# Patient Record
Sex: MALE | Race: ASIAN | NOT HISPANIC OR LATINO
[De-identification: names, ages, dates, MRNs, and addresses within clinical notes are randomized per-mention and may not be internally consistent; named-entity substitution may affect disease eponyms.]

---

## 2022-05-16 ENCOUNTER — NON-APPOINTMENT (OUTPATIENT)
Age: 42
End: 2022-05-16

## 2022-12-02 PROBLEM — Z00.00 ENCOUNTER FOR PREVENTIVE HEALTH EXAMINATION: Status: ACTIVE | Noted: 2022-12-02

## 2022-12-13 ENCOUNTER — APPOINTMENT (OUTPATIENT)
Dept: HEPATOLOGY | Facility: CLINIC | Age: 42
End: 2022-12-13

## 2022-12-13 ENCOUNTER — APPOINTMENT (OUTPATIENT)
Dept: HEPATOLOGY | Facility: CLINIC | Age: 42
End: 2022-12-13
Payer: COMMERCIAL

## 2022-12-13 VITALS
TEMPERATURE: 98.4 F | BODY MASS INDEX: 26.45 KG/M2 | HEART RATE: 67 BPM | OXYGEN SATURATION: 97 % | WEIGHT: 224 LBS | HEIGHT: 77 IN | RESPIRATION RATE: 16 BRPM | DIASTOLIC BLOOD PRESSURE: 92 MMHG | SYSTOLIC BLOOD PRESSURE: 131 MMHG

## 2022-12-13 DIAGNOSIS — K60.2 ANAL FISSURE, UNSPECIFIED: ICD-10-CM

## 2022-12-13 DIAGNOSIS — Z78.9 OTHER SPECIFIED HEALTH STATUS: ICD-10-CM

## 2022-12-13 DIAGNOSIS — Z87.19 PERSONAL HISTORY OF OTHER DISEASES OF THE DIGESTIVE SYSTEM: ICD-10-CM

## 2022-12-13 DIAGNOSIS — K21.9 GASTRO-ESOPHAGEAL REFLUX DISEASE W/OUT ESOPHAGITIS: ICD-10-CM

## 2022-12-13 DIAGNOSIS — K64.4 RESIDUAL HEMORRHOIDAL SKIN TAGS: ICD-10-CM

## 2022-12-13 PROCEDURE — 99204 OFFICE O/P NEW MOD 45 MIN: CPT

## 2022-12-22 ENCOUNTER — NON-APPOINTMENT (OUTPATIENT)
Age: 42
End: 2022-12-22

## 2022-12-22 NOTE — ASSESSMENT
[FreeTextEntry1] : 41 y/o M with rectal fissure, hemorrhoids, GERD, chronic hep B. Last labs 5/2022 showed LFTs WNL and low viremia. Pt with symptomatic GERD, but he is trying to change his diet and is off of PPI. Pt with recent change on stool consistency. No bleeding. \par \par \par Plans:\par \par HBV: Natural course of HBV discussed with pt. Fibroscan once a year. Fibroscan today showed F0-F1, S2-S3. Labs today. \par \par HCC Screening: Abd US q6m. Abd US before next visit.\par \par Fatty liver: Natural course of fatty liver discussed with pt. Advised to diet/exercise to lose weights. Lipid management as per PCP.\par \par Hemorrhoids/Change in stool: referred to get colonoscopy. Pt said he will decide to do it at [facility] or [facility]. Numbers given to pt to call to schedule. \par \par GERD: Pt is changing diet to control GERD. Not on PPI. \par \par RTC in 2-3 months. \par \par \par

## 2022-12-22 NOTE — HISTORY OF PRESENT ILLNESS
[FreeTextEntry1] : \par \par 41 y/o M with rectal fissure, hemorrhoids, GERD, fatty liver, chronic hep B self referred to hepatology for follow up. Pt used to see Dr. Ngoc Pineda at [facility]. Pt reports he probably has hep B since he was a child. He is not aware if his parents or sibling has hep B. Reports he was always told he has inactive hep B and does not need to start treatment. \par In 5/2022 pt was given prednisone for back pain and developed diarrhea and rectal bleed in the setting of steroids. Rectal bleeding stopped and pt was off of prednisone. \par \par Pt with GERD and not on PPI. Pt reports he is trying to change his diet to help with GERD. Reports change in stool consistency lately. No bleeding. \par \par Pt is preparing for IVF with his wife and wants hepatology clearance. Today pt reports feeling well. Denies fever, chills, n/v/d/c ,SOB, CP, HA, dizziness, abd pain, jaundice. \par \par \par \par Social Hx \par Tobacco: Never \par Alcohol: no\par illicit drug: no \par Herb and dietary Supplement: no \par FMH of liver disease: None\par \par Labs:\par 5/24/22\par AST/ALT 27/43   ALP 60    TB 0.8     GLU 89\par HBV DNA 78\par H/H 15.7/46.5    \par \par 10/20/21\par HBSAb 61.4\par \par Scans:\par Fibroscan 12/13/22: 376 CAP (S2-S3)  4.4kPA (F0-F1)\par Abd US 5/19/2022: Fatty liver. areas of probable fatty sparing again seen in left hepatic lobe. Cholelithiasis. \par \par Fibroscan 2020:  (was 386) KpA normal\par \par \par Procedures:\par Colonoscopy: never\par EGD 6/29/2022: Z-line was regular. small hiatal hernia. Small amount of phytobezoar in gastric body. Single localized erosion without bleeding.Path: squamous esophageal mucosa with mild reactive changes. No columnar mucosa.\par

## 2022-12-22 NOTE — PHYSICAL EXAM
[Non-Tender] : non-tender [General Appearance - Alert] : alert [General Appearance - In No Acute Distress] : in no acute distress [General Appearance - Well Nourished] : well nourished [General Appearance - Well Developed] : well developed [General Appearance - Well-Appearing] : healthy appearing [Sclera] : the sclera and conjunctiva were normal [Neck Appearance] : the appearance of the neck was normal [] : no respiratory distress [Exaggerated Use Of Accessory Muscles For Inspiration] : no accessory muscle use [Heart Rate And Rhythm] : heart rate was normal and rhythm regular [Edema] : there was no peripheral edema [Veins - Varicosity Changes] : there were no varicosital changes [Bowel Sounds] : normal bowel sounds [Abdomen Soft] : soft [Abdomen Tenderness] : non-tender [Abdomen Mass (___ Cm)] : no abdominal mass palpated [No Focal Deficits] : no focal deficits [Oriented To Time, Place, And Person] : oriented to person, place, and time [Scleral Icterus] : No Scleral Icterus [Spider Angioma] : No spider angioma(s) were observed [Abdominal  Ascites] : no ascites [Asterixis] : no asterixis observed [Jaundice] : No jaundice [Palmar Erythema] : no Palmar Erythema

## 2022-12-22 NOTE — REVIEW OF SYSTEMS
[Fever] : no fever [Chills] : no chills [Feeling Poorly] : not feeling poorly [Feeling Tired] : not feeling tired [Nosebleeds] : no nosebleeds [Nasal Discharge] : no nasal discharge [Sore Throat] : no sore throat [Hoarseness] : no hoarseness [Chest Pain] : no chest pain [Palpitations] : no palpitations [Leg Claudication] : no intermittent leg claudication [Lower Ext Edema] : no extremity edema [Shortness Of Breath] : no shortness of breath [Wheezing] : no wheezing [Cough] : no cough [SOB on Exertion] : no shortness of breath during exertion [Abdominal Pain] : no abdominal pain [Vomiting] : no vomiting [Constipation] : no constipation [Diarrhea] : no diarrhea [Heartburn] : no heartburn [Melena] : no melena [Confused] : no confusion [Dizziness] : no dizziness

## 2023-01-12 LAB
AFP-TM SERPL-MCNC: 2.2 NG/ML
ALBUMIN SERPL ELPH-MCNC: 4.9 G/DL
ALP BLD-CCNC: 69 U/L
ALT SERPL-CCNC: 35 U/L
ANION GAP SERPL CALC-SCNC: 12 MMOL/L
AST SERPL-CCNC: 26 U/L
BASOPHILS # BLD AUTO: 0.04 K/UL
BASOPHILS NFR BLD AUTO: 0.8 %
BILIRUB SERPL-MCNC: 0.9 MG/DL
BUN SERPL-MCNC: 12 MG/DL
CALCIUM SERPL-MCNC: 9.6 MG/DL
CHLORIDE SERPL-SCNC: 103 MMOL/L
CO2 SERPL-SCNC: 24 MMOL/L
CREAT SERPL-MCNC: 1.02 MG/DL
EGFR: 94 ML/MIN/1.73M2
EOSINOPHIL # BLD AUTO: 0.14 K/UL
EOSINOPHIL NFR BLD AUTO: 2.9 %
GLUCOSE SERPL-MCNC: 88 MG/DL
HBV DNA # SERPL NAA+PROBE: 37 IU/ML
HBV SURFACE AB SERPL IA-ACNC: 66.1 MIU/ML
HCT VFR BLD CALC: 49.3 %
HEPB DNA PCR INT: DETECTED
HEPB DNA PCR LOG: 1.57 LOGIU/ML
HGB BLD-MCNC: 16.4 G/DL
IMM GRANULOCYTES NFR BLD AUTO: 0.2 %
LYMPHOCYTES # BLD AUTO: 1.81 K/UL
LYMPHOCYTES NFR BLD AUTO: 36.9 %
MAN DIFF?: NORMAL
MCHC RBC-ENTMCNC: 29.1 PG
MCHC RBC-ENTMCNC: 33.3 GM/DL
MCV RBC AUTO: 87.4 FL
MONOCYTES # BLD AUTO: 0.36 K/UL
MONOCYTES NFR BLD AUTO: 7.3 %
NEUTROPHILS # BLD AUTO: 2.55 K/UL
NEUTROPHILS NFR BLD AUTO: 51.9 %
PLATELET # BLD AUTO: 180 K/UL
POTASSIUM SERPL-SCNC: 4.3 MMOL/L
PROT SERPL-MCNC: 7.8 G/DL
RBC # BLD: 5.64 M/UL
RBC # FLD: 12.7 %
SODIUM SERPL-SCNC: 139 MMOL/L
WBC # FLD AUTO: 4.91 K/UL

## 2023-02-28 ENCOUNTER — TRANSCRIPTION ENCOUNTER (OUTPATIENT)
Age: 43
End: 2023-02-28

## 2023-03-09 ENCOUNTER — OUTPATIENT (OUTPATIENT)
Dept: OUTPATIENT SERVICES | Facility: HOSPITAL | Age: 43
LOS: 1 days | End: 2023-03-09
Payer: COMMERCIAL

## 2023-03-09 ENCOUNTER — APPOINTMENT (OUTPATIENT)
Dept: ULTRASOUND IMAGING | Facility: CLINIC | Age: 43
End: 2023-03-09
Payer: COMMERCIAL

## 2023-03-09 ENCOUNTER — APPOINTMENT (OUTPATIENT)
Dept: HEPATOLOGY | Facility: CLINIC | Age: 43
End: 2023-03-09
Payer: COMMERCIAL

## 2023-03-09 DIAGNOSIS — B18.1 CHRONIC VIRAL HEPATITIS B WITHOUT DELTA-AGENT: ICD-10-CM

## 2023-03-09 PROCEDURE — 76981 USE PARENCHYMA: CPT

## 2023-03-09 PROCEDURE — 76700 US EXAM ABDOM COMPLETE: CPT

## 2023-03-09 PROCEDURE — 76700 US EXAM ABDOM COMPLETE: CPT | Mod: 26

## 2023-03-13 ENCOUNTER — APPOINTMENT (OUTPATIENT)
Dept: HEPATOLOGY | Facility: CLINIC | Age: 43
End: 2023-03-13
Payer: COMMERCIAL

## 2023-03-13 VITALS
SYSTOLIC BLOOD PRESSURE: 110 MMHG | HEART RATE: 65 BPM | DIASTOLIC BLOOD PRESSURE: 75 MMHG | OXYGEN SATURATION: 96 % | RESPIRATION RATE: 16 BRPM | TEMPERATURE: 97.6 F

## 2023-03-13 PROCEDURE — 99213 OFFICE O/P EST LOW 20 MIN: CPT

## 2023-03-14 ENCOUNTER — APPOINTMENT (OUTPATIENT)
Dept: HEPATOLOGY | Facility: CLINIC | Age: 43
End: 2023-03-14

## 2023-03-28 NOTE — ASSESSMENT
[FreeTextEntry1] : Brayan Allen is a 41 y/o M with rectal fissure, hemorrhoids, GERD, fatty liver, chronic hep B (Treatment naive)\par \par # Chronic hepatitis B\par  Natural course of HBV discussed with pt. \par  Fibroscan 3/9/23: F0-1 \par \par HBsAb 66.1 mIU/mL\par HBV DNA detected 37 IU/mL\par - 2 possibilities: Defective surface antibody vs seroconversion in progress\par - Will check HBsAg\par \par # HCC Screening\par - Abd US q6m. Abd US before next visit.\par \par # Fatty liver\par - Natural course of fatty liver discussed with pt. Advised to diet/exercise to lose weights. Lipid management as per PCP.\par \par # Hepatology clearance\par - Last visit: Pt is preparing for IVF with his wife and wants hepatology clearance\par - The patient is does not have liver cirrhosis. There is no absolute liver related contraindication for the proposed surgery under general anesthesia or IVF. \par \par RTO in 6 months (Sep 2023)

## 2023-03-28 NOTE — HISTORY OF PRESENT ILLNESS
[FreeTextEntry1] : Brayan Allen is a 41 y/o M with rectal fissure, hemorrhoids, GERD, fatty liver, chronic hep B (treatment naive) self referred to hepatology for 1st follow up after labs/US/fibroscan. Pt used to see Dr. Ngoc Pineda at [facility]. Pt reports he probably has hep B since he was a child. He is not aware if his parents or sibling has hep B. Reports he was always told he has inactive hep B and does not need to start treatment. \par In 5/2022 pt was given prednisone for back pain and developed diarrhea and rectal bleed in the setting of steroids. Rectal bleeding stopped and pt was off of prednisone. Pt with GERD and not on PPI. Pt reports he is trying to change his diet to help with GERD. Reports change in stool consistency lately. No bleeding. \par \par - Today pt reports feeling well. He denies fever, chills, N/V/D, blood in urine/stool/sputum, CP, SOB, and abd pain. \par - Patient will get colonoscopy soon.\par \par [Social Hx] \par Tobacco: Never      Alcohol: no\par illicit drug: no           Herb and dietary Supplement: no \par FMH of liver disease: None\par \par [Labs]\par 12/14/23\par CBC wnl \par AFP 2.2  CMP wnl ALT 35\par HBsAb 66.1 mIU/mL\par HBV DNA detected 37 IU/mL\par \par 5/24/22\par AST/ALT 27/43 ALP 60 TB 0.8 GLU 89\par HBV DNA 78\par H/H 15.7/46.5 \par \par 10/20/21\par HBSAb 61.4\par \par [Imaging]\par Fibroscan 3/9/23: F0-1 S2-3 ( 3.6kPa)\par \par US abd 3/9/23: Liver: Hepatic steatosis. No focal hepatic lesion. Main portal vein patent with normal direction of flow. Smooth hepatic surface. Bile ducts: Normal caliber. Common bile duct measures 3 mm. Gallbladder: Cholelithiasis.  Otherwise unremarkable\par \par [Procedures]\par Colonoscopy: never\par EGD 6/29/2022: Z-line was regular. small hiatal hernia. Small amount of phytobezoar in gastric body. Single localized erosion without bleeding.Path: squamous esophageal mucosa with mild reactive changes. No columnar mucosa.\par \par

## 2023-03-28 NOTE — PHYSICAL EXAM
[Non-Tender] : non-tender [General Appearance - Alert] : alert [General Appearance - In No Acute Distress] : in no acute distress [General Appearance - Well Nourished] : well nourished [General Appearance - Well Developed] : well developed [General Appearance - Well-Appearing] : healthy appearing [Sclera] : the sclera and conjunctiva were normal [Neck Appearance] : the appearance of the neck was normal [] : no respiratory distress [Exaggerated Use Of Accessory Muscles For Inspiration] : no accessory muscle use [Heart Rate And Rhythm] : heart rate was normal and rhythm regular [Edema] : there was no peripheral edema [Veins - Varicosity Changes] : there were no varicosital changes [Bowel Sounds] : normal bowel sounds [Abdomen Soft] : soft [Abdomen Tenderness] : non-tender [Abdomen Mass (___ Cm)] : no abdominal mass palpated [Abnormal Walk] : normal gait [Skin Color & Pigmentation] : normal skin color and pigmentation [No Focal Deficits] : no focal deficits [Oriented To Time, Place, And Person] : oriented to person, place, and time [Scleral Icterus] : No Scleral Icterus [Spider Angioma] : No spider angioma(s) were observed [Abdominal  Ascites] : no ascites [Splenomegaly] : no splenomegaly [Asterixis] : no asterixis observed [Jaundice] : No jaundice [Palmar Erythema] : no Palmar Erythema

## 2023-11-05 ENCOUNTER — NON-APPOINTMENT (OUTPATIENT)
Age: 43
End: 2023-11-05

## 2023-11-06 ENCOUNTER — EMERGENCY (EMERGENCY)
Facility: HOSPITAL | Age: 43
LOS: 1 days | Discharge: HOME | End: 2023-11-06
Attending: STUDENT IN AN ORGANIZED HEALTH CARE EDUCATION/TRAINING PROGRAM
Payer: COMMERCIAL

## 2023-11-06 VITALS
WEIGHT: 229.94 LBS | DIASTOLIC BLOOD PRESSURE: 91 MMHG | HEART RATE: 64 BPM | SYSTOLIC BLOOD PRESSURE: 138 MMHG | OXYGEN SATURATION: 99 % | RESPIRATION RATE: 18 BRPM | HEIGHT: 77 IN | TEMPERATURE: 97 F

## 2023-11-06 LAB
ALBUMIN SERPL ELPH-MCNC: 4.7 G/DL — SIGNIFICANT CHANGE UP (ref 3.3–5)
ALBUMIN SERPL ELPH-MCNC: 4.7 G/DL — SIGNIFICANT CHANGE UP (ref 3.3–5)
ALP SERPL-CCNC: 67 U/L — SIGNIFICANT CHANGE UP (ref 40–120)
ALP SERPL-CCNC: 67 U/L — SIGNIFICANT CHANGE UP (ref 40–120)
ALT FLD-CCNC: 55 U/L — HIGH (ref 10–45)
ALT FLD-CCNC: 55 U/L — HIGH (ref 10–45)
ANION GAP SERPL CALC-SCNC: 11 MMOL/L — SIGNIFICANT CHANGE UP (ref 5–17)
ANION GAP SERPL CALC-SCNC: 11 MMOL/L — SIGNIFICANT CHANGE UP (ref 5–17)
AST SERPL-CCNC: 35 U/L — SIGNIFICANT CHANGE UP (ref 10–40)
AST SERPL-CCNC: 35 U/L — SIGNIFICANT CHANGE UP (ref 10–40)
BASOPHILS # BLD AUTO: 0.05 K/UL — SIGNIFICANT CHANGE UP (ref 0–0.2)
BASOPHILS # BLD AUTO: 0.05 K/UL — SIGNIFICANT CHANGE UP (ref 0–0.2)
BASOPHILS NFR BLD AUTO: 1 % — SIGNIFICANT CHANGE UP (ref 0–2)
BASOPHILS NFR BLD AUTO: 1 % — SIGNIFICANT CHANGE UP (ref 0–2)
BILIRUB SERPL-MCNC: 1 MG/DL — SIGNIFICANT CHANGE UP (ref 0.2–1.2)
BILIRUB SERPL-MCNC: 1 MG/DL — SIGNIFICANT CHANGE UP (ref 0.2–1.2)
BUN SERPL-MCNC: 9 MG/DL — SIGNIFICANT CHANGE UP (ref 7–23)
BUN SERPL-MCNC: 9 MG/DL — SIGNIFICANT CHANGE UP (ref 7–23)
CALCIUM SERPL-MCNC: 9.6 MG/DL — SIGNIFICANT CHANGE UP (ref 8.4–10.5)
CALCIUM SERPL-MCNC: 9.6 MG/DL — SIGNIFICANT CHANGE UP (ref 8.4–10.5)
CHLORIDE SERPL-SCNC: 102 MMOL/L — SIGNIFICANT CHANGE UP (ref 96–108)
CHLORIDE SERPL-SCNC: 102 MMOL/L — SIGNIFICANT CHANGE UP (ref 96–108)
CO2 SERPL-SCNC: 26 MMOL/L — SIGNIFICANT CHANGE UP (ref 22–31)
CO2 SERPL-SCNC: 26 MMOL/L — SIGNIFICANT CHANGE UP (ref 22–31)
CREAT SERPL-MCNC: 0.98 MG/DL — SIGNIFICANT CHANGE UP (ref 0.5–1.3)
CREAT SERPL-MCNC: 0.98 MG/DL — SIGNIFICANT CHANGE UP (ref 0.5–1.3)
EGFR: 98 ML/MIN/1.73M2 — SIGNIFICANT CHANGE UP
EGFR: 98 ML/MIN/1.73M2 — SIGNIFICANT CHANGE UP
EOSINOPHIL # BLD AUTO: 0.1 K/UL — SIGNIFICANT CHANGE UP (ref 0–0.5)
EOSINOPHIL # BLD AUTO: 0.1 K/UL — SIGNIFICANT CHANGE UP (ref 0–0.5)
EOSINOPHIL NFR BLD AUTO: 2.1 % — SIGNIFICANT CHANGE UP (ref 0–6)
EOSINOPHIL NFR BLD AUTO: 2.1 % — SIGNIFICANT CHANGE UP (ref 0–6)
GLUCOSE SERPL-MCNC: 96 MG/DL — SIGNIFICANT CHANGE UP (ref 70–99)
GLUCOSE SERPL-MCNC: 96 MG/DL — SIGNIFICANT CHANGE UP (ref 70–99)
HCT VFR BLD CALC: 50.1 % — HIGH (ref 39–50)
HCT VFR BLD CALC: 50.1 % — HIGH (ref 39–50)
HGB BLD-MCNC: 17 G/DL — SIGNIFICANT CHANGE UP (ref 13–17)
HGB BLD-MCNC: 17 G/DL — SIGNIFICANT CHANGE UP (ref 13–17)
IMM GRANULOCYTES NFR BLD AUTO: 0.4 % — SIGNIFICANT CHANGE UP (ref 0–0.9)
IMM GRANULOCYTES NFR BLD AUTO: 0.4 % — SIGNIFICANT CHANGE UP (ref 0–0.9)
LYMPHOCYTES # BLD AUTO: 1.76 K/UL — SIGNIFICANT CHANGE UP (ref 1–3.3)
LYMPHOCYTES # BLD AUTO: 1.76 K/UL — SIGNIFICANT CHANGE UP (ref 1–3.3)
LYMPHOCYTES # BLD AUTO: 36.9 % — SIGNIFICANT CHANGE UP (ref 13–44)
LYMPHOCYTES # BLD AUTO: 36.9 % — SIGNIFICANT CHANGE UP (ref 13–44)
MAGNESIUM SERPL-MCNC: 1.9 MG/DL — SIGNIFICANT CHANGE UP (ref 1.6–2.6)
MAGNESIUM SERPL-MCNC: 1.9 MG/DL — SIGNIFICANT CHANGE UP (ref 1.6–2.6)
MCHC RBC-ENTMCNC: 29 PG — SIGNIFICANT CHANGE UP (ref 27–34)
MCHC RBC-ENTMCNC: 29 PG — SIGNIFICANT CHANGE UP (ref 27–34)
MCHC RBC-ENTMCNC: 33.9 GM/DL — SIGNIFICANT CHANGE UP (ref 32–36)
MCHC RBC-ENTMCNC: 33.9 GM/DL — SIGNIFICANT CHANGE UP (ref 32–36)
MCV RBC AUTO: 85.5 FL — SIGNIFICANT CHANGE UP (ref 80–100)
MCV RBC AUTO: 85.5 FL — SIGNIFICANT CHANGE UP (ref 80–100)
MONOCYTES # BLD AUTO: 0.36 K/UL — SIGNIFICANT CHANGE UP (ref 0–0.9)
MONOCYTES # BLD AUTO: 0.36 K/UL — SIGNIFICANT CHANGE UP (ref 0–0.9)
MONOCYTES NFR BLD AUTO: 7.5 % — SIGNIFICANT CHANGE UP (ref 2–14)
MONOCYTES NFR BLD AUTO: 7.5 % — SIGNIFICANT CHANGE UP (ref 2–14)
NEUTROPHILS # BLD AUTO: 2.48 K/UL — SIGNIFICANT CHANGE UP (ref 1.8–7.4)
NEUTROPHILS # BLD AUTO: 2.48 K/UL — SIGNIFICANT CHANGE UP (ref 1.8–7.4)
NEUTROPHILS NFR BLD AUTO: 52.1 % — SIGNIFICANT CHANGE UP (ref 43–77)
NEUTROPHILS NFR BLD AUTO: 52.1 % — SIGNIFICANT CHANGE UP (ref 43–77)
NRBC # BLD: 0 /100 WBCS — SIGNIFICANT CHANGE UP (ref 0–0)
NRBC # BLD: 0 /100 WBCS — SIGNIFICANT CHANGE UP (ref 0–0)
NT-PROBNP SERPL-SCNC: <36 PG/ML — SIGNIFICANT CHANGE UP (ref 0–300)
NT-PROBNP SERPL-SCNC: <36 PG/ML — SIGNIFICANT CHANGE UP (ref 0–300)
PLATELET # BLD AUTO: 175 K/UL — SIGNIFICANT CHANGE UP (ref 150–400)
PLATELET # BLD AUTO: 175 K/UL — SIGNIFICANT CHANGE UP (ref 150–400)
POTASSIUM SERPL-MCNC: 4 MMOL/L — SIGNIFICANT CHANGE UP (ref 3.5–5.3)
POTASSIUM SERPL-MCNC: 4 MMOL/L — SIGNIFICANT CHANGE UP (ref 3.5–5.3)
POTASSIUM SERPL-SCNC: 4 MMOL/L — SIGNIFICANT CHANGE UP (ref 3.5–5.3)
POTASSIUM SERPL-SCNC: 4 MMOL/L — SIGNIFICANT CHANGE UP (ref 3.5–5.3)
PROT SERPL-MCNC: 7.9 G/DL — SIGNIFICANT CHANGE UP (ref 6–8.3)
PROT SERPL-MCNC: 7.9 G/DL — SIGNIFICANT CHANGE UP (ref 6–8.3)
RBC # BLD: 5.86 M/UL — HIGH (ref 4.2–5.8)
RBC # BLD: 5.86 M/UL — HIGH (ref 4.2–5.8)
RBC # FLD: 12.7 % — SIGNIFICANT CHANGE UP (ref 10.3–14.5)
RBC # FLD: 12.7 % — SIGNIFICANT CHANGE UP (ref 10.3–14.5)
SODIUM SERPL-SCNC: 139 MMOL/L — SIGNIFICANT CHANGE UP (ref 135–145)
SODIUM SERPL-SCNC: 139 MMOL/L — SIGNIFICANT CHANGE UP (ref 135–145)
TROPONIN T, HIGH SENSITIVITY RESULT: 7 NG/L — SIGNIFICANT CHANGE UP (ref 0–51)
TROPONIN T, HIGH SENSITIVITY RESULT: 7 NG/L — SIGNIFICANT CHANGE UP (ref 0–51)
TROPONIN T, HIGH SENSITIVITY RESULT: <6 NG/L — SIGNIFICANT CHANGE UP (ref 0–51)
TROPONIN T, HIGH SENSITIVITY RESULT: <6 NG/L — SIGNIFICANT CHANGE UP (ref 0–51)
WBC # BLD: 4.77 K/UL — SIGNIFICANT CHANGE UP (ref 3.8–10.5)
WBC # BLD: 4.77 K/UL — SIGNIFICANT CHANGE UP (ref 3.8–10.5)
WBC # FLD AUTO: 4.77 K/UL — SIGNIFICANT CHANGE UP (ref 3.8–10.5)
WBC # FLD AUTO: 4.77 K/UL — SIGNIFICANT CHANGE UP (ref 3.8–10.5)

## 2023-11-06 PROCEDURE — 99223 1ST HOSP IP/OBS HIGH 75: CPT

## 2023-11-06 PROCEDURE — 71046 X-RAY EXAM CHEST 2 VIEWS: CPT | Mod: 26

## 2023-11-06 NOTE — ED PROVIDER NOTE - PHYSICAL EXAMINATION
CONSTITUTIONAL: Well appearing and in no apparent distress.  ENT: Airway patent, moist mucous membranes.   EYES: Pupils equal, round and reactive to light. EOMI. Conjunctiva normal appearing.   CARDIAC: Normal rate, regular rhythm.  Heart sounds S1, S2.    RESPIRATORY: Breath sounds clear and equal bilaterally.   CHEST: No chest wall TTP.   GASTROINTESTINAL: Abdomen soft, non-tender, not distended.  MUSCULOSKELETAL: Spine appears normal.  NEUROLOGICAL: Alert and oriented x3, no focal deficits, no motor or sensory deficits. 5/5 muscle strength throughout.  SKIN: Skin normal color, warm, dry and intact.   PSYCHIATRIC: Anxious.

## 2023-11-06 NOTE — ED CDU PROVIDER INITIAL DAY NOTE - OBJECTIVE STATEMENT
42 yo M with a PMH of high triglycerides, anxiety p/w acute onset of sharp right sided chest pain around 11AM that started at rest. Sxs radiated across his chest to his back. Lasted 10 mins approx. Not pleuritic. Now reports dull ache all day. Intermittent sharp pain only with certain movements. Has never had before. No associated SOB, diaphoresis or lightheadedness. Denies nausea, vomiting, fever, chills, palpitations, leg pain/swelling, headache, dizziness. Non smoker  Of note, pt sees Cards, Dr. Coombs for high triglycerides. Was told he needs an exercise stress test but has not had cardiac testing as of yet. No recent travel. No personal or fam hx of CAD/MI/VTE.  In ED, patient had ekg no signs of acute ischemia, troponin <6, chest x ray no signs of acute pathology. Pt sent to CDU for frequent reeval, vitals q 4hrs, telemetry monitoring and CT coronaries.

## 2023-11-06 NOTE — ED CDU PROVIDER DISPOSITION NOTE - ATTENDING APP SHARED VISIT CONTRIBUTION OF CARE
Patient's CT and Lipid panel reviewed. Cardiology consult appreciated for starting cholesterol medication and outpatient follow up. Plan discussed with patient and strict return precautions.

## 2023-11-06 NOTE — ED CDU PROVIDER INITIAL DAY NOTE - ATTENDING APP SHARED VISIT CONTRIBUTION OF CARE
Reggie West DO: I have personally performed a face to face medical and diagnostic evaluation of the patient. I have discussed with and reviewed the Resident's and/or ACP's and/or Medical/PA/NP student's note and agree with the History, ROS, Physical Exam and MDM unless otherwise indicated. A brief summary of my personal evaluation and impression can be found below.     see ed provider note for physical exam + hpi    44 yo M with a PMH of high triglycerides, anxiety p/w acute onset of sharp right sided chest pain around 11AM that started at rest. Sxs radiated across his chest to his back. Lasted 10 mins approx. Not pleuritic. Now reports dull ache all day. Intermittent sharp pain only with certain movements. Has never had before. No associated SOB, diaphoresis or lightheadedness. Denies nausea, vomiting, fever, chills, palpitations, leg pain/swelling, headache, dizziness. Non smoker    Of note, pt sees Cards, Dr. Coombs for high triglycerides. Was told he needs an exercise stress test but has not had cardiac testing as of yet. No recent travel. No personal or fam hx of CAD/MI/VTE.  In ED, patient had ekg no signs of acute ischemia, troponin <6, chest x ray no signs of acute pathology. Pt sent to CDU for frequent reeval, vitals q 4hrs, telemetry monitoring and CT coronaries.

## 2023-11-06 NOTE — ED ADULT NURSE NOTE - OBJECTIVE STATEMENT
Male 43 years old with no past medical history came in for chest pain. Pt reports generalized pins and needles all over his body, today he had sudden onset of chest and back pain, sharp 9/10. In ED 6/10. Denies sob, sweating, cough, fever, chills, nausea and vomiting. Pt went to urgent care center and was told his EKG is abnormal and instructed to come to ED for further evaluation. Will monitor.

## 2023-11-06 NOTE — ED PROVIDER NOTE - ATTENDING APP SHARED VISIT CONTRIBUTION OF CARE
Reggie West DO: I have personally performed a face to face medical and diagnostic evaluation of the patient. I have discussed with and reviewed the Resident's and/or ACP's and/or Medical/PA/NP student's note and agree with the History, ROS, Physical Exam and MDM unless otherwise indicated. A brief summary of my personal evaluation and impression can be found below.     44 yo M with a PMH of high triglycerides, anxiety p/w acute onset of sharp right sided chest pain around 11AM that started at rest. Sxs radiated across his chest to his back. Lasted 10 mins approx. Not pleuritic. Now reports dull ache all day. Intermittent sharp pain only with certain movements. Has never had before. No associated SOB, diaphoresis or lightheadedness. Denies nausea, vomiting, fever, chills, palpitations, leg pain/swelling, headache, dizziness. Non smoker    Of note, pt sees Cards, Dr. Coombs for high triglycerides, believes prior level approx 700. Was told he needs an exercise stress test but has not had cardiac testing as of yet. No recent travel. No personal or fam hx of CAD/MI/VTE.    CONSTITUTIONAL: well-appearing, in NAD  SKIN: Warm dry, normal skin turgor  HEAD: NCAT  EYES: EOMI, PERRLA, no scleral icterus, conjunctiva pink  NECK: Supple; non tender. Full ROM.  CARD: RRR, no murmurs.  MSK: Full ROM, no bony tenderness, no pedal edema, no calf tenderness  NEURO: normal motor. normal sensory. CN II-XII intact. Cerebellar testing normal. Normal gait.  PSYCH: Cooperative,     no pe risk factors, slightly irregular ekg, low concern cp story but w/ hypertriglyceridemia consider further cardiac testing, no recent uri or infectious symptoms  concerning for pna but will eval on cxr, anticipate observation for stress vs echo vs ct coronaries

## 2023-11-06 NOTE — ED CDU PROVIDER DISPOSITION NOTE - CLINICAL COURSE
42 yo M with a PMH of high triglycerides, anxiety p/w acute onset of sharp right sided chest pain around 11AM that started at rest. Sxs radiated across his chest to his back. Lasted 10 mins approx. Not pleuritic. Now reports dull ache all day. Intermittent sharp pain only with certain movements. Has never had before. No associated SOB, diaphoresis or lightheadedness. Denies nausea, vomiting, fever, chills, palpitations, leg pain/swelling, headache, dizziness. Non smoker  	Of note, pt sees Cards, Dr. Coombs for high triglycerides. Was told he needs an exercise stress test but has not had cardiac testing as of yet. No recent travel. No personal or fam hx of CAD/MI/VTE.  In ED, patient had ekg no signs of acute ischemia, troponin <6, chest x ray no signs of acute pathology. Pt sent to CDU for frequent reeval, vitals q 4hrs, telemetry monitoring and CT coronaries. 44 yo M with a PMH of high triglycerides, anxiety p/w acute onset of sharp right sided chest pain around 11AM that started at rest. Sxs radiated across his chest to his back. Lasted 10 mins approx. Not pleuritic. Now reports dull ache all day. Intermittent sharp pain only with certain movements. Has never had before. No associated SOB, diaphoresis or lightheadedness. Denies nausea, vomiting, fever, chills, palpitations, leg pain/swelling, headache, dizziness. Non smoker  	Of note, pt sees Cards, Dr. Coombs for high triglycerides. Was told he needs an exercise stress test but has not had cardiac testing as of yet. No recent travel. No personal or fam hx of CAD/MI/VTE.  In ED, patient had ekg no signs of acute ischemia, troponin <6, chest x ray no signs of acute pathology. Pt sent to CDU for frequent reeval, vitals q 4hrs, telemetry monitoring and CT coronaries. While in ED patient had CT coronaries. Evaluated by cardiologist. Calcium score 14. Cleared for dc home with new rx for cholesterol medication. Stable for dc.

## 2023-11-06 NOTE — ED CDU PROVIDER DISPOSITION NOTE - CARE PROVIDER_API CALL
Ben North  Cardiology  [address] [place] [address], [facility] [address]  [place] [address]  Phone: [phone]  Fax: [phone]  Follow Up Time:

## 2023-11-06 NOTE — ED ADULT NURSE NOTE - CHIEF COMPLAINT QUOTE
[place] [facility] [place] and was sent here for an abnormal EKG.

## 2023-11-06 NOTE — ED CDU PROVIDER INITIAL DAY NOTE - DETAILS
42 yo M with a PMH of high triglycerides, anxiety p/w acute onset of sharp right sided chest pain  Plan: frequent reeval, vitals q 4hrs, telemetry monitoring and CT coronaries.

## 2023-11-06 NOTE — ED PROVIDER NOTE - OBJECTIVE STATEMENT
44 yo M with a PMH of high triglycerides, anxiety p/w acute onset of sharp right sided chest pain around 11AM that started at rest. Sxs radiated across his chest to his back. Lasted 10 mins approx. Not pleuritic. Now reports dull ache all day. Intermittent sharp pain only with certain movements. Has never had before. No associated SOB, diaphoresis or lightheadedness. Denies nausea, vomiting, fever, chills, palpitations, leg pain/swelling, headache, dizziness. Non smoker  Of note, pt sees Cards, Dr. Coombs for high triglycerides. Was told he needs an exercise stress test but has not had cardiac testing as of yet. No recent travel. No personal or fam hx of CAD/MI/VTE.

## 2023-11-06 NOTE — ED CDU PROVIDER DISPOSITION NOTE - PATIENT PORTAL LINK FT
You can access the FollowMyHealth Patient Portal offered by [facility] by registering at the following website: [email or web address]. By joining [facility] [employer]’s FollowMyHealth portal, you will also be able to view your health information using other applications (apps) compatible with our system.

## 2023-11-06 NOTE — ED CDU PROVIDER DISPOSITION NOTE - NSFOLLOWUPINSTRUCTIONS_ED_ALL_ED_FT
1) Follow-up with your Primary Medical Doctor or referred doctor. Call today / next business day for prompt follow-up.  2) Return to Emergency room for any worsening or persistent pain, weakness, fever, or any other concerning symptoms.  3) See attached instruction sheets for additional information, including information regarding signs and symptoms to look out for, reasons to seek immediate care and other important instructions.  4) Follow-up with any specialists as discussed / noted as well. 1) Follow-up with your Primary Medical Doctor or referred doctor. Call today / next business day for prompt follow-up. Follow up with cardiologist Dr. Vela.     Mary [facility], [address], [address]  [place] [address]  Phone: [phone]  Fax: [phone]    2) Return to Emergency room for any worsening or persistent pain, weakness, fever, or any other concerning symptoms.  3) Please begin taking Tricor 160 mg daily. Please begin taking Lovaza 1000 mg twice daily. Please follow a diet that is low in cholesterol.

## 2023-11-07 VITALS
HEART RATE: 73 BPM | RESPIRATION RATE: 18 BRPM | TEMPERATURE: 98 F | DIASTOLIC BLOOD PRESSURE: 64 MMHG | SYSTOLIC BLOOD PRESSURE: 103 MMHG | OXYGEN SATURATION: 98 %

## 2023-11-07 DIAGNOSIS — E78.1 PURE HYPERGLYCERIDEMIA: ICD-10-CM

## 2023-11-07 DIAGNOSIS — R07.9 CHEST PAIN, UNSPECIFIED: ICD-10-CM

## 2023-11-07 LAB
A1C WITH ESTIMATED AVERAGE GLUCOSE RESULT: 5.7 % — HIGH (ref 4–5.6)
A1C WITH ESTIMATED AVERAGE GLUCOSE RESULT: 5.7 % — HIGH (ref 4–5.6)
CHOLEST SERPL-MCNC: 174 MG/DL — SIGNIFICANT CHANGE UP
CHOLEST SERPL-MCNC: 174 MG/DL — SIGNIFICANT CHANGE UP
ESTIMATED AVERAGE GLUCOSE: 117 MG/DL — HIGH (ref 68–114)
ESTIMATED AVERAGE GLUCOSE: 117 MG/DL — HIGH (ref 68–114)
HDLC SERPL-MCNC: 26 MG/DL — LOW
HDLC SERPL-MCNC: 26 MG/DL — LOW
LIDOCAIN IGE QN: 34 U/L — SIGNIFICANT CHANGE UP (ref 7–60)
LIDOCAIN IGE QN: 34 U/L — SIGNIFICANT CHANGE UP (ref 7–60)
LIPID PNL WITH DIRECT LDL SERPL: 78 MG/DL — SIGNIFICANT CHANGE UP
LIPID PNL WITH DIRECT LDL SERPL: 78 MG/DL — SIGNIFICANT CHANGE UP
NON HDL CHOLESTEROL: 149 MG/DL — HIGH
NON HDL CHOLESTEROL: 149 MG/DL — HIGH
TRIGL SERPL-MCNC: 440 MG/DL — HIGH
TRIGL SERPL-MCNC: 440 MG/DL — HIGH

## 2023-11-07 PROCEDURE — 84484 ASSAY OF TROPONIN QUANT: CPT

## 2023-11-07 PROCEDURE — 71046 X-RAY EXAM CHEST 2 VIEWS: CPT

## 2023-11-07 PROCEDURE — 80061 LIPID PANEL: CPT

## 2023-11-07 PROCEDURE — 93005 ELECTROCARDIOGRAM TRACING: CPT | Mod: XU

## 2023-11-07 PROCEDURE — G0378: CPT

## 2023-11-07 PROCEDURE — 75574 CT ANGIO HRT W/3D IMAGE: CPT | Mod: 26,MA

## 2023-11-07 PROCEDURE — 83880 ASSAY OF NATRIURETIC PEPTIDE: CPT

## 2023-11-07 PROCEDURE — 99239 HOSP IP/OBS DSCHRG MGMT >30: CPT

## 2023-11-07 PROCEDURE — 83036 HEMOGLOBIN GLYCOSYLATED A1C: CPT

## 2023-11-07 PROCEDURE — 85025 COMPLETE CBC W/AUTO DIFF WBC: CPT

## 2023-11-07 PROCEDURE — 99285 EMERGENCY DEPT VISIT HI MDM: CPT | Mod: 25

## 2023-11-07 PROCEDURE — 75574 CT ANGIO HRT W/3D IMAGE: CPT | Mod: MA

## 2023-11-07 PROCEDURE — 80053 COMPREHEN METABOLIC PANEL: CPT

## 2023-11-07 PROCEDURE — 83690 ASSAY OF LIPASE: CPT

## 2023-11-07 PROCEDURE — 83735 ASSAY OF MAGNESIUM: CPT

## 2023-11-07 RX ORDER — FENOFIBRATE,MICRONIZED 130 MG
1 CAPSULE ORAL
Qty: 14 | Refills: 0
Start: 2023-11-07 | End: 2023-11-20

## 2023-11-07 RX ORDER — OMEGA-3 ACID ETHYL ESTERS 1 G
1 CAPSULE ORAL
Qty: 28 | Refills: 0
Start: 2023-11-07 | End: 2023-11-20

## 2023-11-07 NOTE — ED CDU PROVIDER SUBSEQUENT DAY NOTE - PROGRESS NOTE DETAILS
Patient evaluated at bedside. NAD. VSS. No events on tele. No pain at this time. CTC revealed "*  Punctate calcified plaque in LAD with minimal (1-24%) intraluminal   narrowing. Calcium score is 14." Cardiology Dr. Vela evaluated at bedside. Stated pt can be dc home and f/u with him in office. Will send lipase and he will f/u result in office. Do not need to wait for result prior to dc. Will send pt home with fenofibrate and Lovaza as per cardiology. Discussed all results and recommendations with pt. Answered all questions and concerns. CDU attending Dr. Morfin evaluated at bedside and agrees with plan for dc home. Stable for dc. Esme Walh PA-C

## 2023-11-07 NOTE — ED CDU PROVIDER SUBSEQUENT DAY NOTE - CLINICAL SUMMARY MEDICAL DECISION MAKING FREE TEXT BOX
RGUJRAL 43-year-old male history of high triglycerides, GERD, anxiety presented with chest pain radiating to his back.  Patient was admitted to CDU for ACS evaluation with CT coronaries.  On exam this morning, patient states he used to be on Tricor and stopped it in October 2022 and had has been managing his diet and exercise.  Patient denies abdominal pain, nausea, vomiting or history of pancreatitis.  Denies any pain at this time.  On exam, Patient is awake,alert,oriented x 3. Patient is well appearing and in no acute distress. Patient's chest is clear to ausculation, +s1s2. Abdomen is soft nd/nt +BS. Extremity with no swelling or calf tenderness. Continue to monitor on telemetry and follow up CT coronaries and lipid profile.

## 2023-11-07 NOTE — ED ADULT NURSE REASSESSMENT NOTE - NSFALLUNIVINTERV_ED_ALL_ED
Bed/Stretcher in lowest position, wheels locked, appropriate side rails in place/Call bell, personal items and telephone in reach/Instruct patient to call for assistance before getting out of bed/chair/stretcher/Non-slip footwear applied when patient is off stretcher/[place] to call system/Physically safe environment - no spills, clutter or unnecessary equipment/Purposeful proactive rounding/Room/bathroom lighting operational, light cord in reach
Bed/Stretcher in lowest position, wheels locked, appropriate side rails in place/Call bell, personal items and telephone in reach/Instruct patient to call for assistance before getting out of bed/chair/stretcher/Non-slip footwear applied when patient is off stretcher/[place] to call system/Physically safe environment - no spills, clutter or unnecessary equipment/Purposeful proactive rounding/Room/bathroom lighting operational, light cord in reach

## 2023-11-07 NOTE — ED ADULT NURSE REASSESSMENT NOTE - NS ED NURSE REASSESS COMMENT FT1
18.00 Pt is evaluated by CDU MD Ce Elizabeth . pt is feeling better.  Pt is discharged . Ml out  LINDA Victoria   explained the follow up care & gave the discharge summary  . Pt has stable vitals steady gait A&OX 4 at the time of Discharge
07.00 Received the Pt from  JOESPH Aguila  Pt is Observed for CP for CTC . Received the Pt A&OX 4  Pt obeys commands Ora N/V/D fever chills cp SOB   Comfort care & safety measures continued  IV site looks clean & dry no signs of infiltration noted pt denies  pain IV site .Pt is oriented to the unit Plan of care explained .  Pt is advised to call for help  call bell with in the reach pt verbalized the understanding .  pending CDU  MD parker . GCS 15/15 A&OX 4 PERRLA  size 3 Strong upper & lower extremities steady gait  Pt is ambulatory & independent   No facial droop  No Hand Leg drop denies numbness tingling  Plan of care ongoing
Pt received from JOESPH Butterfield. Pt A&OX4, oriented to CDU & plan of care was discussed. Pt in CDU for tele and CT coronaries. Pt denies any chest pain, SOB, dizziness or palpitations. V/S stable, pt IV in place, patent and free of signs of infiltration. Ambulated to BR independently. Family at bed side. Safety & comfort measures maintained. Call bell in reach. Will continue to monitor.

## 2023-11-07 NOTE — CONSULT NOTE ADULT - SUBJECTIVE AND OBJECTIVE BOX
CHIEF COMPLAINT:    HISTORY OF PRESENT ILLNESS:  42 yo M with a PMH of high triglycerides, anxiety p/w acute onset of sharp right sided chest pain around 11AM that started at rest. Sxs radiated across his chest to his back. Lasted 10 mins approx. Not pleuritic. Now reports dull ache all day. Intermittent sharp pain only with certain movements. Has never had before. No associated SOB, diaphoresis or lightheadedness. Denies nausea, vomiting, fever, chills, palpitations, leg pain/swelling, headache, dizziness. Non smoker  Of note, pt sees Cards, Dr. Coombs for high triglycerides. Was told he needs an exercise stress test but has not had cardiac testing as of yet. No recent travel. No personal or fam hx of CAD/MI/VTE.    PAST MEDICAL & SURGICAL HISTORY:  High triglycerides      Anxiety      No significant past surgical history              MEDICATIONS:                  FAMILY HISTORY:  No pertinent family history in first degree relatives        SOCIAL HISTORY:    [ ] Non-smoker  [ ] Smoker  [ ] Alcohol    Allergies    Allergy Status Unknown    Intolerances    	    REVIEW OF SYSTEMS:  CONSTITUTIONAL: No fever, weight loss, or fatigue  EYES: No eye pain, visual disturbances, or discharge  ENMT:  No difficulty hearing, tinnitus, vertigo; No sinus or throat pain  NECK: No pain or stiffness  RESPIRATORY: No cough, wheezing, chills or hemoptysis; No Shortness of Breath  CARDIOVASCULAR: +chest pain, palpitations, passing out, dizziness, or leg swelling  GASTROINTESTINAL: No abdominal or epigastric pain. No nausea, vomiting, or hematemesis; No diarrhea or constipation. No melena or hematochezia.  GENITOURINARY: No dysuria, frequency, hematuria, or incontinence  NEUROLOGICAL: No headaches, memory loss, loss of strength, numbness, or tremors  SKIN: No itching, burning, rashes, or lesions   LYMPH Nodes: No enlarged glands  ENDOCRINE: No heat or cold intolerance; No hair loss  MUSCULOSKELETAL: No joint pain or swelling; No muscle, back, or extremity pain  PSYCHIATRIC: No depression, anxiety, mood swings, or difficulty sleeping  HEME/LYMPH: No easy bruising, or bleeding gums  ALLERY AND IMMUNOLOGIC: No hives or eczema	    [ ] All others negative	  [ ] Unable to obtain    PHYSICAL EXAM:  T(C): 36.7 (23 @ 15:32), Max: 36.7 (23 @ 08:05)  HR: 73 (23 @ 15:32) (54 - 73)  BP: 103/64 (23 @ 15:32) (103/64 - 142/103)  RR: 18 (23 @ 15:32) (18 - 20)  SpO2: 98% (23 @ 15:32) (95% - 99%)  Wt(kg): --  I&O's Summary      Appearance: Normal	  HEENT:   Normal oral mucosa, PERRL, EOMI	  Lymphatic: No lymphadenopathy  Cardiovascular: Normal S1 S2, No JVD, No murmurs, No edema  Respiratory: Lungs clear to auscultation	  Psychiatry: A & O x 3, Mood & affect appropriate  Gastrointestinal:  Soft, Non-tender, + BS	  Skin: No rashes, No ecchymoses, No cyanosis	  Neurologic: Non-focal  Extremities: Normal range of motion, No clubbing, cyanosis or edema  Vascular: Peripheral pulses palpable 2+ bilaterally    TELEMETRY: 	SR    ECG:  	NSR non specific stt changes   RADIOLOGY:  < from: CT Angio Heart and Coronaries w/ IV Cont (23 @ 14:24) >    ACC: [id number] EXAM:  CT ANGIO HEART CORONARY IC   ORDERED BY: VON CHAVEZ     PROCEDURE DATE:  2023          INTERPRETATION:  EXAM: Coronary CT Angiography    INDICATIONS: 43-year-old male with chest pain, is evaluated for coronary   artery disease  PRIOR REVASCULARIZATION: None    COMPARISON: None    TECHNIQUE:  Acquisition:  images, Noncontrast prospectively triggered exam,   bolus triggering, retrospectively ECG-gated .  Delayed images were not   acquired.  Multiple data setswere reconstructed at different R-R   intervals, if applicable.  Multiplanar post processing and 3D volume rendering were performed and   interpreted.  A maximum width full field of view was also reconstructed   and reviewed.    MEDICATION: A focusedhistory and medication reconciliation was performed   at the time of exam.  0.8 mg sublingual nitroglycerin was administered   prior to the exam.   20 ml IV metoprolol was administered prior to the   exam.    CONTRAST:  CTA was performed using the test bolus technique using 65   mlOmnipaque 350 at a rate as documented in the image record.  A normal   saline flush was injected immediately following each contrast injection.  VITAL SIGNS: Monitored and documented pre- and post- exam, as noted in   the scanned document archived in the medical record.    HEART RATE AND RHYTHM: Rhythm: Sinus as documented in the image record.   Heart Rate: 59    QUALITY: Good.  There are no uninterpretable segments.      FINDINGS:    NATIVE CORONARY ARTERIES:  There is no evidence for anomalous coronary   artery origin or course. There is right artery dominance.    *  Left Main (LM): no plaque; no luminal narrowing.   The left main   trifurcates into LAD, LCx and ramus intermedius.  The ramus intermedius   demonstrates no significant stenosis or plaque.  *  Left anterior descending (LAD): There is a punctate calcification in   mid LAD with minimal (1-24%) intraluminal narrowing. One patent diagonal   (D) branch(es) identified.  The distal LAD wraps around the apex.  *  Left circumflex (LCx): LCx is diminutive. no plaque; no luminal   narrowing. One patent obtuse marginal (OM) branches identified.  *  Right coronary artery (RCA): no plaque; no luminal narrowing.    The posterior descending artery (PDA) and the posterior left ventricular   (PLV) branches originate from the RCA.    CALCIUM SCORE: The calculated Agatston score is 14.    Agatston Score:  Left main: 0.  Left anterior descendin.  Left circumflex: 0.  Right coronary: 0.    Calcium volume (cubic millimeters):  Left main: 0.  Left anterior descendin.  Left circumflex: 0.  Right coronary: 0.    CARDIAC MORPHOLOGY AND FUNCTION: There is mild cardiomegaly The left   atrium measures 28mm. .    VALVES: Normal CT appearance of the aortic and mitral valves, without   calcification.  PERICARDIUM: Normal pericardial thickness. No pericardial effusion or   calcification.  AORTA:  The thoracic aorta was incompletely imaged due to coronary-only   protocol.  PULMONARY ARTERIES: The pulmonary arteries are not completely imaged, but   the visualized portions are normal, without dilatation or central filling   defect.    NONCARDIAC FINDINGS:  There is no focal consolidation. There is no suspicious pulmonary   nodule/mass. Limited evaluation of the upper abdomen is unremarkable.   There is no lytic or blastic osseous lesion.    IMPRESSION:  *  Punctate calcified plaque in LAD with minimal (1-24%) intraluminal   narrowing.  *  Calcium score is 14.  *  --- End of Report ---            OSVALDO LAGUNAS MD; Attending Radiologist  This document has been electronically signed. 2023  3:53PM    < end of copied text >    OTHER: 	  	  LABS:	 	    CARDIAC MARKERS:    Lipid Profile (23 @ 22:35)   Triglycerides, Serum: 440: Test Repeated   Interpretive Comment:   Triglyceride concentration can be influenced when measured in the   non-fasting state.   Acceptable: <150 mg/dL (for adults) ; < 90 mg/dL (for children)   Borderline: 150-199 mg/dL (for adults) ;  mg/dL (for children)   High: >=200 mg/dL (for adults) ; >=130 mg/dL (for children) mg/dL  Troponin T, High Sensitivity Result: 7: * Troponin T, High Sensitivity Result: <6: *                             17.0   4.77  )-----------( 175      ( 2023 18:14 )             50.1         139  |  102  |  9   ----------------------------<  96  4.0   |  26  |  0.98    Ca    9.6      2023 18:14  Mg     1.9         TPro  7.9  /  Alb  4.7  /  TBili  1.0  /  DBili  x   /  AST  35  /  ALT  55<H>  /  AlkPhos  67      proBNP:   Lipid Profile:   HgA1c:   TSH:

## 2023-11-07 NOTE — ED CDU PROVIDER SUBSEQUENT DAY NOTE - HISTORY
CDU PROGRESS NOTE LINDA CHAVEZ: Pt resting comfortably, feeling well without complaint. NAD, VSS. No events on telemetry. Will continue to monitor, CTC in am. CDU PROGRESS NOTE PA KATHY: Pt resting comfortably, feeling well without complaint. NAD, VSS. No events on telemetry. HsT x2 stable. Will continue to monitor, CTC in am.

## 2024-05-21 NOTE — ED PROVIDER NOTE - ATTENDING SHARED VISIT SELECTORS
History/Exam/Medical Decision Making
None
no fever/no chills/no sweating/no anorexia/no weight loss/no weight gain/no malaise

## 2024-05-23 DIAGNOSIS — B18.1 CHRONIC VIRAL HEPATITIS B W/OUT DELTA-AGENT: ICD-10-CM

## 2024-05-23 DIAGNOSIS — Z12.11 ENCOUNTER FOR SCREENING FOR MALIGNANT NEOPLASM OF COLON: ICD-10-CM

## 2024-07-01 ENCOUNTER — NON-APPOINTMENT (OUTPATIENT)
Age: 44
End: 2024-07-01

## 2024-09-26 ENCOUNTER — APPOINTMENT (OUTPATIENT)
Dept: ULTRASOUND IMAGING | Facility: CLINIC | Age: 44
End: 2024-09-26
Payer: COMMERCIAL

## 2024-09-26 PROCEDURE — 76700 US EXAM ABDOM COMPLETE: CPT

## 2024-10-01 ENCOUNTER — APPOINTMENT (OUTPATIENT)
Dept: HEPATOLOGY | Facility: CLINIC | Age: 44
End: 2024-10-01
Payer: COMMERCIAL

## 2024-10-01 VITALS
OXYGEN SATURATION: 97 % | DIASTOLIC BLOOD PRESSURE: 84 MMHG | HEIGHT: 77 IN | TEMPERATURE: 96.8 F | BODY MASS INDEX: 26.81 KG/M2 | HEART RATE: 71 BPM | RESPIRATION RATE: 16 BRPM | SYSTOLIC BLOOD PRESSURE: 122 MMHG | WEIGHT: 227.07 LBS

## 2024-10-01 DIAGNOSIS — B18.1 CHRONIC VIRAL HEPATITIS B W/OUT DELTA-AGENT: ICD-10-CM

## 2024-10-01 DIAGNOSIS — K76.0 FATTY (CHANGE OF) LIVER, NOT ELSEWHERE CLASSIFIED: ICD-10-CM

## 2024-10-01 PROCEDURE — 76981 USE PARENCHYMA: CPT

## 2024-10-01 PROCEDURE — 99214 OFFICE O/P EST MOD 30 MIN: CPT

## 2024-10-01 NOTE — ASSESSMENT
[FreeTextEntry1] : 44y male  # Chronic Hep B # Treatment naive Last labs are out of date LFT wnl 8/2024  # HBsAb 66.1 mIU/mL # HBV DNA detected 37 IU/mL - 2 possibilities: neutralized Hep B (seroconversion in progress) vs develop Ab but defected ab  - Will check HBsAg  # Fatty liver - Natural course of fatty liver discussed with pt. Advised to diet/exercise to lose weights. Lipid management as per PCP.  Labs today Fibroscan today 10/1/24:  3.9 kPa. 379 dB/m. F0-1 S 2-3 MRI abd w/wo cont in a month to r/o HCC RTO in Dec 2024

## 2024-10-01 NOTE — HISTORY OF PRESENT ILLNESS
[FreeTextEntry1] : Brayan Allen is a 43 y/o Male with Chronic Hep B (treatment naive) who presents for the follow-up. Pt used to see Dr. Ngoc Pienda at [facility]. Pt reports he probably has hep B since he was a child. He is not aware if his parents or sibling has hep B. Reports he was always told he has inactive hep B and does not need to start treatment. PT also has rectal fissure, hemorrhoids, GERD.   [Hep B labs] ALT<35 in 2024 HBsAb 66.1 mIU/mL  in 2022 HBV DNA detected 37 IU/mL in 2022  Today he reports feeling well and no physical complaints.   [Social Hx] Tobacco: Never  Alcohol: Denies illicit drug: Denies  Herb and dietary Supplement: omega3 and vit D FMH of liver disease: None  [Current Meds] methylphenidate for focusing fenofibrate 160mg since  early 2024.   [Labs] 8/29/24   AST/ALT 28/33  CMP wnl  4/2024 [id number]  12/14/23 CBC wnl  AFP 2.2 CMP wnl ALT 35 HBsAb 66.1 mIU/mL HBV DNA detected 37 IU/mL  5/24/22 AST/ALT 27/43 ALP 60 TB 0.8 GLU 89 HBV DNA 78 H/H 15.7/46.5   10/20/21 HBSAb 61.4  [Imaging] US abd 9/26/24: Liver: Increased echogenicity posterior attenuation is not typical for fatty infiltration. Geographic region of sparing in the right hepatic lobe measures 8.3 x 3.3 x 4.6 cm Bile ducts: Normal caliber. Common bile duct measures 6 mm. Gallbladder: Cholelithiasis.  No focal tenderness or evidence of cholecystitis. Pancreas: Visualized portions are within normal limits. Spleen: 12.1 cm. Within normal limits. Right kidney: 12.4 cm. No hydronephrosis. Left kidney: 11.8 cm. No hydronephrosis. Ascites: None. Aorta and IVC: Visualized portions are within normal limits. IMPRESSION: Fatty infiltration of the liver with geographic region of sparing adjacent to the gallbladder and portal vein measuring up to 8.3 cm, likely focal fatty sparing. Findings may further evaluated with MRI with and without contrast. Cholelithiasis.  Fibroscan 3/9/23: F0-1 S2-3 ( 3.6kPa)  [Procedures] Colonoscopy 8/2024 by [facility]: precancerous polyp. next GI appt 1/2024  EGD 6/29/2022: Z-line was regular. small hiatal hernia. Small amount of phytobezoar in gastric body. Single localized erosion without bleeding.Path: squamous esophageal mucosa with mild reactive changes. No columnar mucosa.

## 2024-10-01 NOTE — PHYSICAL EXAM
[Scleral Icterus] : No Scleral Icterus [Spider Angioma] : No spider angioma(s) were observed [Abdominal  Ascites] : no ascites [Splenomegaly] : no splenomegaly [Non-Tender] : non-tender [Asterixis] : no asterixis observed [Jaundice] : No jaundice [Palmar Erythema] : no Palmar Erythema [General Appearance - Alert] : alert [General Appearance - In No Acute Distress] : in no acute distress [General Appearance - Well Nourished] : well nourished [General Appearance - Well Developed] : well developed [General Appearance - Well-Appearing] : healthy appearing [Sclera] : the sclera and conjunctiva were normal [Neck Appearance] : the appearance of the neck was normal [] : no respiratory distress [Exaggerated Use Of Accessory Muscles For Inspiration] : no accessory muscle use [Heart Rate And Rhythm] : heart rate was normal and rhythm regular [Edema] : there was no peripheral edema [Veins - Varicosity Changes] : there were no varicosital changes [Bowel Sounds] : normal bowel sounds [Abdomen Soft] : soft [Abdomen Tenderness] : non-tender [Abdomen Mass (___ Cm)] : no abdominal mass palpated [Abnormal Walk] : normal gait [Skin Color & Pigmentation] : normal skin color and pigmentation [No Focal Deficits] : no focal deficits [Oriented To Time, Place, And Person] : oriented to person, place, and time

## 2024-10-01 NOTE — HISTORY OF PRESENT ILLNESS
[FreeTextEntry1] : Brayan Aleln is a 43 y/o Male with Chronic Hep B (treatment naive) who presents for the follow-up. Pt used to see Dr. Ngoc Pineda at [facility]. Pt reports he probably has hep B since he was a child. He is not aware if his parents or sibling has hep B. Reports he was always told he has inactive hep B and does not need to start treatment. PT also has rectal fissure, hemorrhoids, GERD.   [Hep B labs] ALT<35 in 2024 HBsAb 66.1 mIU/mL  in 2022 HBV DNA detected 37 IU/mL in 2022  Today he reports feeling well and no physical complaints.   [Social Hx] Tobacco: Never  Alcohol: Denies illicit drug: Denies  Herb and dietary Supplement: omega3 and vit D FMH of liver disease: None  [Current Meds] methylphenidate for focusing fenofibrate 160mg since  early 2024.   [Labs] 8/29/24   AST/ALT 28/33  CMP wnl  4/2024 [id number]  12/14/23 CBC wnl  AFP 2.2 CMP wnl ALT 35 HBsAb 66.1 mIU/mL HBV DNA detected 37 IU/mL  5/24/22 AST/ALT 27/43 ALP 60 TB 0.8 GLU 89 HBV DNA 78 H/H 15.7/46.5   10/20/21 HBSAb 61.4  [Imaging] US abd 9/26/24: Liver: Increased echogenicity posterior attenuation is not typical for fatty infiltration. Geographic region of sparing in the right hepatic lobe measures 8.3 x 3.3 x 4.6 cm Bile ducts: Normal caliber. Common bile duct measures 6 mm. Gallbladder: Cholelithiasis.  No focal tenderness or evidence of cholecystitis. Pancreas: Visualized portions are within normal limits. Spleen: 12.1 cm. Within normal limits. Right kidney: 12.4 cm. No hydronephrosis. Left kidney: 11.8 cm. No hydronephrosis. Ascites: None. Aorta and IVC: Visualized portions are within normal limits. IMPRESSION: Fatty infiltration of the liver with geographic region of sparing adjacent to the gallbladder and portal vein measuring up to 8.3 cm, likely focal fatty sparing. Findings may further evaluated with MRI with and without contrast. Cholelithiasis.  Fibroscan 3/9/23: F0-1 S2-3 ( 3.6kPa)  [Procedures] Colonoscopy 8/2024 by [facility]: precancerous polyp. next GI appt 1/2024  EGD 6/29/2022: Z-line was regular. small hiatal hernia. Small amount of phytobezoar in gastric body. Single localized erosion without bleeding.Path: squamous esophageal mucosa with mild reactive changes. No columnar mucosa.

## 2024-12-17 ENCOUNTER — APPOINTMENT (OUTPATIENT)
Dept: HEPATOLOGY | Facility: CLINIC | Age: 44
End: 2024-12-17

## 2025-04-29 ENCOUNTER — APPOINTMENT (OUTPATIENT)
Dept: HEPATOLOGY | Facility: CLINIC | Age: 45
End: 2025-04-29
Payer: COMMERCIAL

## 2025-04-29 VITALS
HEIGHT: 77 IN | SYSTOLIC BLOOD PRESSURE: 124 MMHG | BODY MASS INDEX: 27.04 KG/M2 | RESPIRATION RATE: 16 BRPM | WEIGHT: 229 LBS | TEMPERATURE: 97.1 F | OXYGEN SATURATION: 96 % | HEART RATE: 67 BPM | DIASTOLIC BLOOD PRESSURE: 85 MMHG

## 2025-04-29 DIAGNOSIS — B18.1 CHRONIC VIRAL HEPATITIS B W/OUT DELTA-AGENT: ICD-10-CM

## 2025-04-29 DIAGNOSIS — R10.9 UNSPECIFIED ABDOMINAL PAIN: ICD-10-CM

## 2025-04-29 DIAGNOSIS — K76.9 LIVER DISEASE, UNSPECIFIED: ICD-10-CM

## 2025-04-29 DIAGNOSIS — K76.0 FATTY (CHANGE OF) LIVER, NOT ELSEWHERE CLASSIFIED: ICD-10-CM

## 2025-04-29 PROCEDURE — 99213 OFFICE O/P EST LOW 20 MIN: CPT

## 2025-04-30 LAB
AFP-TM SERPL-MCNC: 2.5 NG/ML
ALBUMIN SERPL ELPH-MCNC: 4.7 G/DL
ALP BLD-CCNC: 67 U/L
ALT SERPL-CCNC: 46 U/L
ANION GAP SERPL CALC-SCNC: 14 MMOL/L
AST SERPL-CCNC: 33 U/L
BASOPHILS # BLD AUTO: 0.08 K/UL
BASOPHILS NFR BLD AUTO: 1.6 %
BILIRUB SERPL-MCNC: 0.5 MG/DL
BUN SERPL-MCNC: 16 MG/DL
CALCIUM SERPL-MCNC: 9.5 MG/DL
CANCER AG19-9 SERPL-ACNC: 26 U/ML
CHLORIDE SERPL-SCNC: 103 MMOL/L
CO2 SERPL-SCNC: 24 MMOL/L
CREAT SERPL-MCNC: 0.99 MG/DL
EGFRCR SERPLBLD CKD-EPI 2021: 96 ML/MIN/1.73M2
EOSINOPHIL # BLD AUTO: 0.33 K/UL
EOSINOPHIL NFR BLD AUTO: 6.6 %
GGT SERPL-CCNC: 28 U/L
GLUCOSE SERPL-MCNC: 102 MG/DL
HBV E AB SER QL: REACTIVE
HBV E AG SER QL: NONREACTIVE
HBV SURFACE AB SERPL IA-ACNC: 69 MIU/ML
HBV SURFACE AG SER QL: NONREACTIVE
HCT VFR BLD CALC: 47.4 %
HCV AB SER QL: NONREACTIVE
HCV S/CO RATIO: 0.25 S/CO
HEPATITIS A IGG ANTIBODY: NONREACTIVE
HEPB DNA PCR INT: NOT DETECTED
HEPB DNA PCR LOG: NOT DETECTED LOGIU/ML
HGB BLD-MCNC: 15.3 G/DL
IMM GRANULOCYTES NFR BLD AUTO: 0.8 %
LYMPHOCYTES # BLD AUTO: 1.84 K/UL
LYMPHOCYTES NFR BLD AUTO: 36.9 %
MAN DIFF?: NORMAL
MCHC RBC-ENTMCNC: 29.8 PG
MCHC RBC-ENTMCNC: 32.3 G/DL
MCV RBC AUTO: 92.2 FL
MONOCYTES # BLD AUTO: 0.44 K/UL
MONOCYTES NFR BLD AUTO: 8.8 %
NEUTROPHILS # BLD AUTO: 2.25 K/UL
NEUTROPHILS NFR BLD AUTO: 45.3 %
PLATELET # BLD AUTO: 177 K/UL
POTASSIUM SERPL-SCNC: 3.9 MMOL/L
PROT SERPL-MCNC: 7.5 G/DL
RBC # BLD: 5.14 M/UL
RBC # FLD: 13.8 %
SODIUM SERPL-SCNC: 141 MMOL/L
WBC # FLD AUTO: 4.98 K/UL

## 2025-05-03 LAB — HDV AB SER IA-ACNC: NEGATIVE

## 2025-05-07 ENCOUNTER — APPOINTMENT (OUTPATIENT)
Dept: MRI IMAGING | Facility: CLINIC | Age: 45
End: 2025-05-07

## 2025-05-07 PROCEDURE — 74183 MRI ABD W/O CNTR FLWD CNTR: CPT

## 2025-05-07 PROCEDURE — A9581: CPT

## 2025-05-14 ENCOUNTER — APPOINTMENT (OUTPATIENT)
Dept: HEPATOLOGY | Facility: CLINIC | Age: 45
End: 2025-05-14

## 2025-05-14 VITALS
DIASTOLIC BLOOD PRESSURE: 84 MMHG | RESPIRATION RATE: 16 BRPM | BODY MASS INDEX: 27.87 KG/M2 | WEIGHT: 235 LBS | OXYGEN SATURATION: 95 % | SYSTOLIC BLOOD PRESSURE: 130 MMHG | HEART RATE: 73 BPM

## 2025-05-14 DIAGNOSIS — K21.9 GASTRO-ESOPHAGEAL REFLUX DISEASE W/OUT ESOPHAGITIS: ICD-10-CM

## 2025-05-14 DIAGNOSIS — K76.0 FATTY (CHANGE OF) LIVER, NOT ELSEWHERE CLASSIFIED: ICD-10-CM

## 2025-05-14 DIAGNOSIS — B18.1 CHRONIC VIRAL HEPATITIS B W/OUT DELTA-AGENT: ICD-10-CM

## 2025-05-14 DIAGNOSIS — Z87.19 PERSONAL HISTORY OF OTHER DISEASES OF THE DIGESTIVE SYSTEM: ICD-10-CM

## 2025-05-14 PROCEDURE — 99213 OFFICE O/P EST LOW 20 MIN: CPT

## 2025-05-14 RX ORDER — PANTOPRAZOLE 40 MG/1
40 TABLET, DELAYED RELEASE ORAL
Qty: 30 | Refills: 2 | Status: ACTIVE | COMMUNITY
Start: 2025-05-14 | End: 1900-01-01